# Patient Record
Sex: MALE | Race: ASIAN | URBAN - METROPOLITAN AREA
[De-identification: names, ages, dates, MRNs, and addresses within clinical notes are randomized per-mention and may not be internally consistent; named-entity substitution may affect disease eponyms.]

---

## 2020-12-14 ENCOUNTER — OFFICE VISIT (OUTPATIENT)
Dept: URBAN - METROPOLITAN AREA CLINIC 113 | Facility: CLINIC | Age: 35
End: 2020-12-14
Payer: COMMERCIAL

## 2020-12-14 ENCOUNTER — WEB ENCOUNTER (OUTPATIENT)
Dept: URBAN - METROPOLITAN AREA CLINIC 113 | Facility: CLINIC | Age: 35
End: 2020-12-14

## 2020-12-14 VITALS
BODY MASS INDEX: 21.77 KG/M2 | SYSTOLIC BLOOD PRESSURE: 105 MMHG | TEMPERATURE: 97.2 F | HEIGHT: 69 IN | DIASTOLIC BLOOD PRESSURE: 69 MMHG | HEART RATE: 80 BPM | WEIGHT: 147 LBS | RESPIRATION RATE: 18 BRPM

## 2020-12-14 DIAGNOSIS — K21.9 GASTROESOPHAGEAL REFLUX DISEASE, UNSPECIFIED WHETHER ESOPHAGITIS PRESENT: ICD-10-CM

## 2020-12-14 DIAGNOSIS — R19.7 DIARRHEA, UNSPECIFIED TYPE: ICD-10-CM

## 2020-12-14 PROCEDURE — G9903 PT SCRN TBCO ID AS NON USER: HCPCS | Performed by: INTERNAL MEDICINE

## 2020-12-14 PROCEDURE — G8482 FLU IMMUNIZE ORDER/ADMIN: HCPCS | Performed by: INTERNAL MEDICINE

## 2020-12-14 PROCEDURE — G8427 DOCREV CUR MEDS BY ELIG CLIN: HCPCS | Performed by: INTERNAL MEDICINE

## 2020-12-14 PROCEDURE — 99204 OFFICE O/P NEW MOD 45 MIN: CPT | Performed by: INTERNAL MEDICINE

## 2020-12-14 PROCEDURE — 1036F TOBACCO NON-USER: CPT | Performed by: INTERNAL MEDICINE

## 2020-12-14 RX ORDER — LANSOPRAZOLE 15 MG/1
1 CAPSULE BEFORE A MEAL CAPSULE, DELAYED RELEASE ORAL ONCE A DAY
Status: ACTIVE | COMMUNITY

## 2020-12-14 NOTE — HPI-TODAY'S VISIT:
This is a 35-year-old male with a history of dyspnea and malaise, headache, and recurrent diarrhea referred from Dr. Fagan for evaluation of recurrent diarrhea. According to referring physician's note, he has tested negative for Covid and had IgM and IgG antibodies are also negative.  He has experienced chest pain with deep inspiration and dyspnea.  His prior work-up has included a normal CRP, CMP, CBC, TSH, RF, CCP, ANCA, IgE.  He has been evaluated by Dr. Zapata (infectious disease) and was not felt to have an active infection.  An alternative diagnosis was being sought.  He had also been evaluated by Dr. Todd who felt as though allergies were not causing his symptoms.  Methacholine challenge and PFTs had returned normal results. He reports onset of symptoms in July initially reporting a dry cough, chest pain, and no fever.  His symptoms improved.  In August, he relapsed and had associated diarrhea and a sensation that he was unable to take a deep breath because of constriction in his ribs.  He has also experienced a pressure type headache that occasionally pulsates, dizziness, and night sweats.  Headaches and night sweats have improved over the last 2 weeks.  He continues to report irregular bowels.  He has a soft bowel movement every morning followed by 2 or 3 unformed stools per day usually occurring after meals.  He denies nocturnal bowel movements.  In July, he experienced small-volume red blood on the tissue.  This has resolved and has not recurred.  A month ago, he had a "stiff feeling" just beneath his umbilicus causing some discomfort.  This has resolved.  He had nausea and July which has also resolved.  He reports a good appetite.  He lost weight in July but his weight has been improving.  He denies any other abdominal symptoms.  6 years ago, he was diagnosed with GERD after he had an EGD. He has been evaluated by infectious disease, pulmonology, and, more recently, neurology and has an MRI of the head scheduled tomorrow. 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  Although he tested negative for Covid twice and negative for antibodies twice, his  states his symptoms are consistent with this syndrome.  He has discussed his symptoms with a physician in [place] who is in agreement.  This physician has also reportedly suggested that post Covid syndrome symptoms include nocturnal reflux that interferes with sleep resulting in a prolonged recovery.  Due to this, he has started taking lansoprazole 15 mg daily.  He is also taking multiple [place] herbs and probiotics upon the recommendation of this physician.  He states he was referred to ascertain whether or not he had any significant GI disease.

## 2020-12-15 ENCOUNTER — LAB OUTSIDE AN ENCOUNTER (OUTPATIENT)
Dept: URBAN - METROPOLITAN AREA CLINIC 113 | Facility: CLINIC | Age: 35
End: 2020-12-15

## 2020-12-18 ENCOUNTER — DASHBOARD ENCOUNTERS (OUTPATIENT)
Age: 35
End: 2020-12-18

## 2020-12-18 PROBLEM — 235595009: Status: ACTIVE | Noted: 2020-12-18

## 2020-12-18 PROBLEM — 62315008: Status: ACTIVE | Noted: 2020-12-18

## 2020-12-22 ENCOUNTER — TELEPHONE ENCOUNTER (OUTPATIENT)
Dept: URBAN - METROPOLITAN AREA CLINIC 113 | Facility: CLINIC | Age: 35
End: 2020-12-22

## 2021-02-17 ENCOUNTER — OFFICE VISIT (OUTPATIENT)
Dept: URBAN - METROPOLITAN AREA CLINIC 113 | Facility: CLINIC | Age: 36
End: 2021-02-17

## 2021-08-24 LAB
CAMPYLOBACTER CULTURE: (no result)
E COLI SHIGA TOXIN EIA: NEGATIVE
LACTOFERRIN, FECAL, QUANT.: <1
OVA + PARASITE EXAM: (no result)
SALMONELLA/SHIGELLA SCREEN: (no result)
WHITE BLOOD CELLS (WBC), STOOL: (no result)

## 2024-12-05 ENCOUNTER — LAB OUTSIDE AN ENCOUNTER (OUTPATIENT)
Dept: URBAN - METROPOLITAN AREA CLINIC 113 | Facility: CLINIC | Age: 39
End: 2024-12-05

## 2024-12-05 ENCOUNTER — OFFICE VISIT (OUTPATIENT)
Dept: URBAN - METROPOLITAN AREA CLINIC 113 | Facility: CLINIC | Age: 39
End: 2024-12-05
Payer: COMMERCIAL

## 2024-12-05 VITALS
BODY MASS INDEX: 20.53 KG/M2 | DIASTOLIC BLOOD PRESSURE: 72 MMHG | HEART RATE: 72 BPM | WEIGHT: 138.6 LBS | SYSTOLIC BLOOD PRESSURE: 97 MMHG | RESPIRATION RATE: 14 BRPM | TEMPERATURE: 97.5 F | HEIGHT: 69 IN

## 2024-12-05 DIAGNOSIS — R10.12 LUQ PAIN: ICD-10-CM

## 2024-12-05 DIAGNOSIS — K62.5 BRIGHT RED BLOOD PER RECTUM: ICD-10-CM

## 2024-12-05 DIAGNOSIS — Z80.0 FAMILY HISTORY OF COLON CANCER: ICD-10-CM

## 2024-12-05 PROBLEM — 312824007: Status: ACTIVE | Noted: 2024-12-05

## 2024-12-05 PROBLEM — 74474003: Status: ACTIVE | Noted: 2024-12-05

## 2024-12-05 PROBLEM — 301715003: Status: ACTIVE | Noted: 2024-12-05

## 2024-12-05 PROCEDURE — 99214 OFFICE O/P EST MOD 30 MIN: CPT | Performed by: NURSE PRACTITIONER

## 2024-12-05 RX ORDER — OMEPRAZOLE 40 MG/1
1 CAPSULE 1/2 TO 1 HOUR BEFORE MORNING MEAL CAPSULE, DELAYED RELEASE ORAL ONCE A DAY
Qty: 30 | Refills: 5 | OUTPATIENT
Start: 2024-12-05

## 2024-12-05 RX ORDER — LANSOPRAZOLE 15 MG/1
1 CAPSULE BEFORE A MEAL CAPSULE, DELAYED RELEASE ORAL ONCE A DAY
Status: ON HOLD | COMMUNITY

## 2024-12-05 NOTE — HPI-TODAY'S VISIT:
This is a 39-year-old male with a history of GERD and recurrent diarrhea presenting after a long interval for evaluation of abdominal pain. He was seen 2020 reporting frequent unformed bowel movements.  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  Stool studies were ordered and he was to begin a fiber supplement and continue probiotics.  If stool studies were negative and symptoms persisted, colonoscopy was planned.  He had a history of GERD and had been asymptomatic off PPI.  He had recently restarted lansoprazole out of concern that he may have reflux associated with post-COVID syndrome.  He was to continue daily therapy and change timing to evening before supper.  Stool studies were negative.  He declined proceeding with colonoscopy.  He canceled his follow-up appointment. He reports an episode of diarrhea in August that lasted 3 to 4 days.  This resolved.  He was doing well for a day or 2.  3 to 4 days later, after eating barbecue, he began to experience discomfort indicated in the left upper quadrant and in his back at the same level.  He reports pain more frequently occurs in his back.  He describes pain as discomfort.  This occurs if he is hungry or if he has eaten a large meal or eaten "bulky food."  He has been decreasing oil in his food and has been observing a soft diet.  His symptoms persist.  He denies heartburn, regurgitation, or dysphagia.  He denies nausea.  After the episode of diarrhea, he had frequent soft or loose stools.  This has improved.  He has a history of occasional red blood per rectum occurring after passing a hard stool described as red blood on the tissue.  He denies other abdominal symptoms. He reports a family history of colon cancer (paternal grandmother) and a paternal history of bile duct cancer (father  at 65.)

## 2024-12-16 ENCOUNTER — OFFICE VISIT (OUTPATIENT)
Dept: URBAN - METROPOLITAN AREA SURGERY CENTER 25 | Facility: SURGERY CENTER | Age: 39
End: 2024-12-16